# Patient Record
Sex: FEMALE | Race: WHITE | NOT HISPANIC OR LATINO | Employment: STUDENT | URBAN - METROPOLITAN AREA
[De-identification: names, ages, dates, MRNs, and addresses within clinical notes are randomized per-mention and may not be internally consistent; named-entity substitution may affect disease eponyms.]

---
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
## 2023-02-03 ENCOUNTER — OFFICE VISIT (OUTPATIENT)
Dept: NEUROLOGY | Facility: CLINIC | Age: 18
End: 2023-02-03
Payer: COMMERCIAL

## 2023-02-03 DIAGNOSIS — G43.109 MIGRAINE WITH VISUAL AURA: Primary | ICD-10-CM

## 2023-02-03 DIAGNOSIS — G43.009 MIGRAINE WITHOUT AURA AND WITHOUT STATUS MIGRAINOSUS, NOT INTRACTABLE: ICD-10-CM

## 2023-02-03 PROCEDURE — 99214 PR OFFICE/OUTPT VISIT, EST, LEVL IV, 30-39 MIN: ICD-10-PCS | Mod: 95,,, | Performed by: NURSE PRACTITIONER

## 2023-02-03 PROCEDURE — 99214 OFFICE O/P EST MOD 30 MIN: CPT | Mod: 95,,, | Performed by: NURSE PRACTITIONER

## 2023-02-03 PROCEDURE — 1160F RVW MEDS BY RX/DR IN RCRD: CPT | Mod: CPTII,95,, | Performed by: NURSE PRACTITIONER

## 2023-02-03 PROCEDURE — 1159F MED LIST DOCD IN RCRD: CPT | Mod: CPTII,95,, | Performed by: NURSE PRACTITIONER

## 2023-02-03 PROCEDURE — 1160F PR REVIEW ALL MEDS BY PRESCRIBER/CLIN PHARMACIST DOCUMENTED: ICD-10-PCS | Mod: CPTII,95,, | Performed by: NURSE PRACTITIONER

## 2023-02-03 PROCEDURE — 1159F PR MEDICATION LIST DOCUMENTED IN MEDICAL RECORD: ICD-10-PCS | Mod: CPTII,95,, | Performed by: NURSE PRACTITIONER

## 2023-02-03 RX ORDER — RIZATRIPTAN BENZOATE 10 MG/1
10 TABLET, ORALLY DISINTEGRATING ORAL
Qty: 12 TABLET | Refills: 5 | Status: SHIPPED | OUTPATIENT
Start: 2023-02-03

## 2023-02-03 RX ORDER — AMITRIPTYLINE HYDROCHLORIDE 25 MG/1
25 TABLET, FILM COATED ORAL NIGHTLY
Qty: 90 TABLET | Refills: 1 | Status: SHIPPED | OUTPATIENT
Start: 2023-02-03 | End: 2023-04-26 | Stop reason: SDUPTHER

## 2023-02-03 NOTE — PROGRESS NOTES
Established Patient   SUBJECTIVE:  Patient ID: Clair Meek   Chief Complaint: Follow-up    History of Present Illness:  Clair Meek is a 17 y.o. female who presents for follow-up of headaches via virtual visit.     The patient location is: in [place]   The chief complaint leading to consultation is: Follow-up  Visit type: Virtual visit with synchronous audio and video  Total time spent with patient: 8 minutes  Each patient to whom he or she provides medical services by telemedicine is:  (1) informed of the relationship between the physician and patient and the respective role of any other health care provider with respect to management of the patient; and (2) notified that he or she may decline to receive medical services by telemedicine and may withdraw from such care at any time.    Recommendations made at last Office Visit on 11/29/22:  - For migraine prevention - continue amitriptyline 25 mg qhs   - For acute migraines - maxalt 10 mg mlt   - Stressed importance of treating migraines with maxalt at onset, do not wait to treat, maxalt less likely to be effective the longer she waits to treat    - Continue tracking headaches   - RTC in 3 months or sooner if needed    02/03/2023 - Interval History:  Migraines have decreased in both frequency and intensity since changing the way she treats acute migraines.  She conitnues to experience headaches on 15/30 days per month, however some days she rates pain 1 out of 10.  Treating acute migraines with maxalt 10 mg mlt, which helps, however feels migraines begin to come back after a few hours.  Does feel migraines are worse in that they are now less responsive to maxalt.  Has noticed some positional component to her headaches.  She has continued amitriptyline 25 mg nightly for migraine prevention.      Otherwise, information below is still accurate and current.     11/29/2022 - Interval History:  Migraines have been better in that they are less frequent, though still  lasting days when she gets one.  Admits migraines are typically present upon waking, will usually drink water and see if this helps abraham her migraine, usually does not help.  Waits to treat with maxalt.  Feels more often than not maxalt is ineffective.  She has continued amitriptyline 25 mg qhs for migraine prevention, does feel higher does has helped to decrease the frequency of her migraines.       Otherwise, information below is still accurate and current.      09/20/2022 - Interval History:  Did not start topiramate because she was going home for the summer, seen by Neurology provider at home who recommended restarting amitriptyline 10 mg nightly for migraine prevention.  Has been taking amitriptyline 10 mg for 3 months and feels headaches have been better for the last 1-1.5 months.  Out of the last 30 days, has had 15 headache days, most of which are mild in intensity, 3-4 moderate to severe migraines.  Typically treats mild headaches with either tylenol, ibuprofen, excedrin.  Treats moderate to severe migraines with maxalt 5 mg mlt, which typically helps with one dose, occasionally has to repeat dose.       Otherwise, information below is still accurate and current.      04/06/2022 - Interval History:  Prednisone taper has helped some, decreased the intensity of her headaches.  However, she continues to experience some type of headache on a daily basis.  Majority of her headaches are not what she considers to be a migraine, maybe 2-3 migraines per week.  Migraines are lasting a few hours in duration.  Treating with caffeine and/or advil, which helps some.     Seen by neuro-ophthalmology who felt anisocoria likely related to amitriptyline.       Seen by pediatrician while back home who ordered mri brain, patient has copy of report with her today.  Per report, small left maxillary sinus retention cyst identified, otherwise imaging is unremarkable.  Pediatrician also started her on vyvanse 10 mg daily for  "treatment of ADD.     Otherwise, information below is still accurate and current.      History of Present Illness:   16 y.o. female with migraine headaches, who presents to clinic alone due to new onset anisocoria and worsening headaches.  Mid-December, began to experience frequent headaches, daily or every other day.  Seen by pediatrician who started her on amitriptyline 10 mg, took amitriptyline 10 mg nightly which was helping "a lot".  However, a few weeks, began to notice irregular dilation of pupils, was recommended to d/c amitriptyline per pediatrician.    Stopped amitriptyline, headaches have returned.  Currently suffering with headaches, again, daily or every other day, though less intense.  Headaches typically last 3 hours in duration.  Can be triggered by bright light, also bright light has trigger the onset of headaches.  In the past, visual symptoms have been more aggravating to her than the pain of a migraine.  Headaches were associated with nausea initially, though not very common.  Headaches are described as a moderate, stabbing pain (in the temples) and aching pain across her forehead.    Does have a personal and family history of migraine with aura.   Seen by ophthalmologist earlier today for dilated eye exam, felt anisocoria was physiologic, no identifiable cause of symptoms.  Had an ear infection in the right ear just before headaches began.    Diagnosed with COVID in November.      Treatments Tried and Response  Amitriptyline - helped, possibly cause of anisocoria  Magnesium/riboflavin -   topiramate - never took   maxlt 5 mg mlt - helping   Amitriptyline 10 - restarted 3 months ago, is helping some   Amitriptyline 25 mg qhs - helping  Maxalt 10 mg mlt -   Ubrelvy 100 mg - given today     Current Medications:    amitriptyline (ELAVIL) 25 MG tablet, Take 1 tablet (25 mg total) by mouth every evening., Disp: 90 tablet, Rfl: 1    JOCELINE 0.35 mg tablet, Take 1 tablet by mouth once daily., Disp: , " Rfl:     methylphenidate HCl (RITALIN LA) 10 MG 24 hr capsule, Take 10 mg by mouth every morning., Disp: , Rfl:     rizatriptan (MAXALT-MLT) 10 MG disintegrating tablet, Take 1 tablet (10 mg total) by mouth every 2 (two) hours as needed for Migraine. Max 30 mg/day., Disp: 12 tablet, Rfl: 5    ubrogepant (UBRELVY) 100 mg tablet, Take 1 tablet (100 mg total) by mouth every 2 (two) hours as needed for Migraine. Max 200 mg/day., Disp: 10 tablet, Rfl: 2    Review of Systems - A review of 10+ systems was conducted with pertinent positive and negative findings documented in HPI with all other systems reviewed and negative.    PFSH: Past medical, family, and social history reviewed as documented in chart with pertinent positive medical, family, and social history detailed in HPI.    OBJECTIVE:  Vitals: There were no vitals taken for this visit.     Physical Exam:  Constitutional: she appears well-developed and well-nourished. she is well groomed. NAD.     ASSESSMENT:  1. Migraine with visual aura    2. Migraine without aura and without status migrainosus, not intractable      PLAN:  - For migraine prevention - continue amitriptyline 25 mg qhs   - For acute migraines - maxalt 10 mg mlt   - Secondary treatment option - trial ubrelvy 100 mg   - Stressed importance of aggressively treating migraine from onset, avoid delays in treatment    - Continue tracking headaches   - RTC in 2-3 months or sooner if needed    Orders Placed This Encounter    ubrogepant (UBRELVY) 100 mg tablet    amitriptyline (ELAVIL) 25 MG tablet    rizatriptan (MAXALT-MLT) 10 MG disintegrating tablet     Questions and concerns were sought and answered to the patient's stated verbal satisfaction.  The patient verbalizes understanding and agreement with the above stated treatment plan.     CC: DO Tiarra Rodriguez, ALEX-C  [facility]   [phone]    Dr. Smith was available during today's encounter.

## 2023-03-06 ENCOUNTER — HOSPITAL ENCOUNTER (OUTPATIENT)
Dept: CARDIOLOGY | Facility: CLINIC | Age: 18
Discharge: HOME | End: 2023-03-06
Payer: COMMERCIAL

## 2023-03-06 ENCOUNTER — OFFICE VISIT (OUTPATIENT)
Dept: CARDIOLOGY | Facility: CLINIC | Age: 18
End: 2023-03-06
Payer: COMMERCIAL

## 2023-03-06 VITALS
OXYGEN SATURATION: 99 % | DIASTOLIC BLOOD PRESSURE: 73 MMHG | HEART RATE: 91 BPM | BODY MASS INDEX: 19.95 KG/M2 | HEIGHT: 68 IN | SYSTOLIC BLOOD PRESSURE: 111 MMHG | WEIGHT: 131.63 LBS

## 2023-03-06 DIAGNOSIS — R07.89 ATYPICAL CHEST PAIN: Primary | ICD-10-CM

## 2023-03-06 DIAGNOSIS — R07.89 ATYPICAL CHEST PAIN: ICD-10-CM

## 2023-03-06 DIAGNOSIS — G43.009 MIGRAINE WITHOUT AURA AND WITHOUT STATUS MIGRAINOSUS, NOT INTRACTABLE: ICD-10-CM

## 2023-03-06 DIAGNOSIS — J45.20 MILD INTERMITTENT ASTHMA WITHOUT COMPLICATION: ICD-10-CM

## 2023-03-06 PROCEDURE — 3078F PR MOST RECENT DIASTOLIC BLOOD PRESSURE < 80 MM HG: ICD-10-PCS | Mod: CPTII,S$GLB,, | Performed by: INTERNAL MEDICINE

## 2023-03-06 PROCEDURE — 3008F PR BODY MASS INDEX (BMI) DOCUMENTED: ICD-10-PCS | Mod: CPTII,S$GLB,, | Performed by: INTERNAL MEDICINE

## 2023-03-06 PROCEDURE — 99999 PR PBB SHADOW E&M-EST. PATIENT-LVL IV: CPT | Mod: PBBFAC,,, | Performed by: INTERNAL MEDICINE

## 2023-03-06 PROCEDURE — 93005 ELECTROCARDIOGRAM TRACING: CPT | Mod: S$GLB,,, | Performed by: INTERNAL MEDICINE

## 2023-03-06 PROCEDURE — 93010 ELECTROCARDIOGRAM REPORT: CPT | Mod: S$GLB,,, | Performed by: INTERNAL MEDICINE

## 2023-03-06 PROCEDURE — 1159F PR MEDICATION LIST DOCUMENTED IN MEDICAL RECORD: ICD-10-PCS | Mod: CPTII,S$GLB,, | Performed by: INTERNAL MEDICINE

## 2023-03-06 PROCEDURE — 99203 PR OFFICE/OUTPT VISIT, NEW, LEVL III, 30-44 MIN: ICD-10-PCS | Mod: S$GLB,,, | Performed by: INTERNAL MEDICINE

## 2023-03-06 PROCEDURE — 1159F MED LIST DOCD IN RCRD: CPT | Mod: CPTII,S$GLB,, | Performed by: INTERNAL MEDICINE

## 2023-03-06 PROCEDURE — 3008F BODY MASS INDEX DOCD: CPT | Mod: CPTII,S$GLB,, | Performed by: INTERNAL MEDICINE

## 2023-03-06 PROCEDURE — 3074F PR MOST RECENT SYSTOLIC BLOOD PRESSURE < 130 MM HG: ICD-10-PCS | Mod: CPTII,S$GLB,, | Performed by: INTERNAL MEDICINE

## 2023-03-06 PROCEDURE — 3078F DIAST BP <80 MM HG: CPT | Mod: CPTII,S$GLB,, | Performed by: INTERNAL MEDICINE

## 2023-03-06 PROCEDURE — 3074F SYST BP LT 130 MM HG: CPT | Mod: CPTII,S$GLB,, | Performed by: INTERNAL MEDICINE

## 2023-03-06 PROCEDURE — 93005 EKG 12-LEAD: ICD-10-PCS | Mod: S$GLB,,, | Performed by: INTERNAL MEDICINE

## 2023-03-06 PROCEDURE — 93010 EKG 12-LEAD: ICD-10-PCS | Mod: S$GLB,,, | Performed by: INTERNAL MEDICINE

## 2023-03-06 PROCEDURE — 99203 OFFICE O/P NEW LOW 30 MIN: CPT | Mod: S$GLB,,, | Performed by: INTERNAL MEDICINE

## 2023-03-06 PROCEDURE — 99999 PR PBB SHADOW E&M-EST. PATIENT-LVL IV: ICD-10-PCS | Mod: PBBFAC,,, | Performed by: INTERNAL MEDICINE

## 2023-03-06 NOTE — PROGRESS NOTES
Subjective:   Patient ID:  Clair Meek is a 17 y.o. female who presents for evaluation of CP    HPI:  Having some CP/discomfortThe patient has no  SOB, TIA, palpitations, syncope or pre-syncope.Patient does exercise. For about a week has non exertional mid chest pain with slight feeling in back that lasts about 20 s or less.Plays [employer]        Review of Systems   Constitutional: Negative for chills, decreased appetite, diaphoresis, fever, malaise/fatigue, night sweats, weight gain and weight loss.   HENT:  Negative for congestion, hoarse voice, nosebleeds, sore throat and tinnitus.    Eyes:  Negative for blurred vision, double vision, vision loss in left eye, vision loss in right eye, visual disturbance and visual halos.   Cardiovascular:  Positive for chest pain. Negative for claudication, cyanosis, dyspnea on exertion, irregular heartbeat, leg swelling, near-syncope, orthopnea, palpitations, paroxysmal nocturnal dyspnea and syncope.   Respiratory:  Negative for cough, hemoptysis, shortness of breath, sleep disturbances due to breathing, snoring, sputum production and wheezing.    Endocrine: Negative for cold intolerance, heat intolerance, polydipsia, polyphagia and polyuria.   Hematologic/Lymphatic: Negative for adenopathy and bleeding problem. Does not bruise/bleed easily.   Skin:  Negative for color change, dry skin, flushing, itching, nail changes, poor wound healing, rash, skin cancer, suspicious lesions and unusual hair distribution.   Musculoskeletal:  Negative for arthritis, back pain, falls, gout, joint pain, joint swelling, muscle cramps, muscle weakness, myalgias and stiffness.   Gastrointestinal:  Negative for abdominal pain, anorexia, change in bowel habit, constipation, diarrhea, dysphagia, heartburn, hematemesis, hematochezia, melena and vomiting.   Genitourinary:  Negative for decreased libido, dysuria, hematuria, hesitancy and urgency.   Neurological:  Negative for excessive daytime  "sleepiness, dizziness, focal weakness, headaches, light-headedness, loss of balance, numbness, paresthesias, seizures, sensory change, tremors, vertigo and weakness.   Psychiatric/Behavioral:  Negative for altered mental status, depression, hallucinations, memory loss, substance abuse and suicidal ideas. The patient does not have insomnia and is not nervous/anxious.    Allergic/Immunologic: Negative for environmental allergies and hives.     Objective: /73 (BP Location: Left arm, Patient Position: Sitting, BP Method: Medium (Automatic))   Pulse 91   Ht 5' 8" (1.727 m)   Wt 59.7 kg (131 lb 9.8 oz)   SpO2 99%   BMI 20.01 kg/m²      Physical Exam  Constitutional:       Appearance: She is well-developed.   HENT:      Head: Normocephalic.   Eyes:      Pupils: Pupils are equal, round, and reactive to light.   Neck:      Thyroid: No thyromegaly.      Vascular: Normal carotid pulses. No carotid bruit, hepatojugular reflux or JVD.   Cardiovascular:      Rate and Rhythm: Normal rate and regular rhythm.      Pulses: Intact distal pulses.      Heart sounds: Normal heart sounds. No murmur heard.    No friction rub. No gallop.   Pulmonary:      Effort: Pulmonary effort is normal. No tachypnea or respiratory distress.      Breath sounds: Normal breath sounds. No wheezing or rales.   Chest:      Chest wall: No tenderness.   Abdominal:      General: Bowel sounds are normal. There is no distension.      Palpations: Abdomen is soft. There is no mass.      Tenderness: There is no abdominal tenderness. There is no guarding or rebound.   Musculoskeletal:         General: No tenderness. Normal range of motion.      Cervical back: Normal range of motion.   Lymphadenopathy:      Cervical: No cervical adenopathy.   Skin:     General: Skin is warm.      Findings: No erythema or rash.   Neurological:      Mental Status: She is alert and oriented to person, place, and time.      Cranial Nerves: No cranial nerve deficit.      " Coordination: Coordination normal.   Psychiatric:         Behavior: Behavior normal.         Thought Content: Thought content normal.         Judgment: Judgment normal.       Assessment:     1. Atypical chest pain    2. Migraine without aura and without status migrainosus, not intractable    3. Mild intermittent asthma without complication        Plan:   Discussed diet , achieving and maintaining ideal body weight, and exercise.   We reviewed meds in detail.  Reassured-Discussed goals, options, plan.  Echo if CP continues or if ECG is abnormal  Try massage or movements to determine if pain is muscular which is more likely  Clair was seen today for establish care, follow-up and chest pain.    Diagnoses and all orders for this visit:    Atypical chest pain  -     EKG 12-lead; Future; Expected date: 03/07/2023  -     Lipid Panel; Future; Expected date: 03/07/2023    Migraine without aura and without status migrainosus, not intractable    Mild intermittent asthma without complication            Follow up for ECG now and lipids soon.

## 2023-03-06 NOTE — PROGRESS NOTES
Your results look fine and do not require any change in treatment. ECG actually looks great but we can still do Echo if symptoms remain worrisome.    Please contact me if you have any additional concerns.    Sincerely,    Vance Ardon

## 2023-03-06 NOTE — PATIENT INSTRUCTIONS
Reassured-Discussed goals, options, plan.  Echo if CP continues or if ECG is abnormal  Try massage or movements to determine if pain is muscular which is more likely

## 2023-03-09 ENCOUNTER — LAB VISIT (OUTPATIENT)
Dept: LAB | Facility: HOSPITAL | Age: 18
End: 2023-03-09
Attending: INTERNAL MEDICINE
Payer: COMMERCIAL

## 2023-03-09 DIAGNOSIS — R07.89 ATYPICAL CHEST PAIN: ICD-10-CM

## 2023-03-09 LAB
CHOLEST SERPL-MCNC: 153 MG/DL (ref 120–199)
CHOLEST/HDLC SERPL: 2.5 {RATIO} (ref 2–5)
HDLC SERPL-MCNC: 61 MG/DL (ref 40–75)
HDLC SERPL: 39.9 % (ref 20–50)
LDLC SERPL CALC-MCNC: 79.2 MG/DL (ref 63–159)
NONHDLC SERPL-MCNC: 92 MG/DL
TRIGL SERPL-MCNC: 64 MG/DL (ref 30–150)

## 2023-03-09 PROCEDURE — 36415 COLL VENOUS BLD VENIPUNCTURE: CPT | Performed by: INTERNAL MEDICINE

## 2023-03-09 PROCEDURE — 80061 LIPID PANEL: CPT | Performed by: INTERNAL MEDICINE

## 2023-05-05 ENCOUNTER — OFFICE VISIT (OUTPATIENT)
Dept: NEUROLOGY | Facility: CLINIC | Age: 18
End: 2023-05-05
Payer: COMMERCIAL

## 2023-05-05 DIAGNOSIS — G43.009 MIGRAINE WITHOUT AURA AND WITHOUT STATUS MIGRAINOSUS, NOT INTRACTABLE: Primary | ICD-10-CM

## 2023-05-05 DIAGNOSIS — G43.109 MIGRAINE WITH VISUAL AURA: ICD-10-CM

## 2023-05-05 PROCEDURE — 1160F PR REVIEW ALL MEDS BY PRESCRIBER/CLIN PHARMACIST DOCUMENTED: ICD-10-PCS | Mod: CPTII,95,, | Performed by: NURSE PRACTITIONER

## 2023-05-05 PROCEDURE — 99214 PR OFFICE/OUTPT VISIT, EST, LEVL IV, 30-39 MIN: ICD-10-PCS | Mod: 95,,, | Performed by: NURSE PRACTITIONER

## 2023-05-05 PROCEDURE — 99214 OFFICE O/P EST MOD 30 MIN: CPT | Mod: 95,,, | Performed by: NURSE PRACTITIONER

## 2023-05-05 PROCEDURE — 1159F MED LIST DOCD IN RCRD: CPT | Mod: CPTII,95,, | Performed by: NURSE PRACTITIONER

## 2023-05-05 PROCEDURE — 1159F PR MEDICATION LIST DOCUMENTED IN MEDICAL RECORD: ICD-10-PCS | Mod: CPTII,95,, | Performed by: NURSE PRACTITIONER

## 2023-05-05 PROCEDURE — 1160F RVW MEDS BY RX/DR IN RCRD: CPT | Mod: CPTII,95,, | Performed by: NURSE PRACTITIONER

## 2023-05-05 RX ORDER — RIMEGEPANT SULFATE 75 MG/75MG
75 TABLET, ORALLY DISINTEGRATING ORAL DAILY PRN
Qty: 16 TABLET | Refills: 2 | Status: SHIPPED | OUTPATIENT
Start: 2023-05-05

## 2023-05-05 RX ORDER — AMITRIPTYLINE HYDROCHLORIDE 25 MG/1
25 TABLET, FILM COATED ORAL NIGHTLY
Qty: 90 TABLET | Refills: 0 | Status: SHIPPED | OUTPATIENT
Start: 2023-05-05 | End: 2023-06-06 | Stop reason: SDUPTHER

## 2023-05-05 NOTE — PROGRESS NOTES
Established Patient   SUBJECTIVE:  Patient ID: Clair Meek   Chief Complaint: Follow-up    History of Present Illness:  Clair Meek is a 17 y.o. female who presents for follow-up of headaches via virtual visit.     The patient location is: in [place]   The chief complaint leading to consultation is: Follow-up  Visit type: Virtual visit with synchronous audio and video  Total time spent with patient: 17 min  Each patient to whom he or she provides medical services by telemedicine is:  (1) informed of the relationship between the physician and patient and the respective role of any other health care provider with respect to management of the patient; and (2) notified that he or she may decline to receive medical services by telemedicine and may withdraw from such care at any time.    Recommendations made at last Office Visit on 2/3/23:  - For migraine prevention - continue amitriptyline 25 mg qhs   - For acute migraines - maxalt 10 mg mlt   - Secondary treatment option - trial ubrelvy 100 mg   - Stressed importance of aggressively treating migraine from onset, avoid delays in treatment    - Continue tracking headaches   - RTC in 2-3 months or sooner if needed    05/05/2023 - Interval History:  Migraines remained relatively stable, though more recently has had a handful of moderate to severe migraines, feels these have been triggered by stress/finals etc.  She has continued amitriptyline 25 mg nightly, which continues to reduce the frequency of her migraines, though does feel she could have better migraine control, also does not like the idea of taking an anti-depressant and would like to change her preventive therapy.  She treats acute migraines with either ubrelvy or maxalt, feels ubrelvy helps take the edge off hte pain but does not fully abort her migraine.  Has only taken ubrelvy and maxalt together once, did find the combination to be more effective than either alone.   Going back home to [place] for the  summer, plans to return to [place] in August.    Otherwise, information below is still accurate and current.     02/03/2023 - Interval History:  Migraines have decreased in both frequency and intensity since changing the way she treats acute migraines.  She conitnues to experience headaches on 15/30 days per month, however some days she rates pain 1 out of 10.  Treating acute migraines with maxalt 10 mg mlt, which helps, however feels migraines begin to come back after a few hours.  Does feel migraines are worse in that they are now less responsive to maxalt.  Has noticed some positional component to her headaches.  She has continued amitriptyline 25 mg nightly for migraine prevention.       Otherwise, information below is still accurate and current.      11/29/2022 - Interval History:  Migraines have been better in that they are less frequent, though still lasting days when she gets one.  Admits migraines are typically present upon waking, will usually drink water and see if this helps abraham her migraine, usually does not help.  Waits to treat with maxalt.  Feels more often than not maxalt is ineffective.  She has continued amitriptyline 25 mg qhs for migraine prevention, does feel higher does has helped to decrease the frequency of her migraines.       Otherwise, information below is still accurate and current.      09/20/2022 - Interval History:  Did not start topiramate because she was going home for the summer, seen by Neurology provider at home who recommended restarting amitriptyline 10 mg nightly for migraine prevention.  Has been taking amitriptyline 10 mg for 3 months and feels headaches have been better for the last 1-1.5 months.  Out of the last 30 days, has had 15 headache days, most of which are mild in intensity, 3-4 moderate to severe migraines.  Typically treats mild headaches with either tylenol, ibuprofen, excedrin.  Treats moderate to severe migraines with maxalt 5 mg mlt, which typically  "helps with one dose, occasionally has to repeat dose.       Otherwise, information below is still accurate and current.      04/06/2022 - Interval History:  Prednisone taper has helped some, decreased the intensity of her headaches.  However, she continues to experience some type of headache on a daily basis.  Majority of her headaches are not what she considers to be a migraine, maybe 2-3 migraines per week.  Migraines are lasting a few hours in duration.  Treating with caffeine and/or advil, which helps some.     Seen by neuro-ophthalmology who felt anisocoria likely related to amitriptyline.       Seen by pediatrician while back home who ordered mri brain, patient has copy of report with her today.  Per report, small left maxillary sinus retention cyst identified, otherwise imaging is unremarkable.  Pediatrician also started her on vyvanse 10 mg daily for treatment of ADD.     Otherwise, information below is still accurate and current.      History of Present Illness:   16 y.o. female with migraine headaches, who presents to clinic alone due to new onset anisocoria and worsening headaches.  Mid-December, began to experience frequent headaches, daily or every other day.  Seen by pediatrician who started her on amitriptyline 10 mg, took amitriptyline 10 mg nightly which was helping "a lot".  However, a few weeks, began to notice irregular dilation of pupils, was recommended to d/c amitriptyline per pediatrician.    Stopped amitriptyline, headaches have returned.  Currently suffering with headaches, again, daily or every other day, though less intense.  Headaches typically last 3 hours in duration.  Can be triggered by bright light, also bright light has trigger the onset of headaches.  In the past, visual symptoms have been more aggravating to her than the pain of a migraine.  Headaches were associated with nausea initially, though not very common.  Headaches are described as a moderate, stabbing pain (in the " temples) and aching pain across her forehead.    Does have a personal and family history of migraine with aura.   Seen by ophthalmologist earlier today for dilated eye exam, felt anisocoria was physiologic, no identifiable cause of symptoms.  Had an ear infection in the right ear just before headaches began.    Diagnosed with COVID in November.      Treatments Tried and Response  Amitriptyline - helped, possibly cause of anisocoria  Magnesium/riboflavin -   topiramate -   maxlt 5 mg mlt - helping   Amitriptyline 10 - restarted 3 months ago, is helping some   Amitriptyline 25 mg qhs - helping  Maxalt 10 mg mlt -   Ubrelvy 100 mg - helping   Emgality - given today   Nurtec 75 mg odt - given today     Current Medications:    amitriptyline (ELAVIL) 25 MG tablet, Take 1 tablet (25 mg total) by mouth every evening., Disp: 90 tablet, Rfl: 0    JOCELINE 0.35 mg tablet, Take 1 tablet by mouth once daily., Disp: , Rfl:     galcanezumab-gnlm 120 mg/mL PnIj, Inject 240 mg into the skin once. for 1 dose, Disp: 2 mL, Rfl: 0    galcanezumab-gnlm 120 mg/mL PnIj, Inject 120 mg into the skin every 28 days. Begin 28 days after loading dose., Disp: 1 each, Rfl: 10    methylphenidate HCl (RITALIN LA) 10 MG 24 hr capsule, Take 10 mg by mouth every morning., Disp: , Rfl:     rimegepant (NURTEC) 75 mg odt, Take 1 tablet (75 mg total) by mouth daily as needed for Migraine. Place ODT tablet on the tongue; alternatively the ODT tablet may be placed under the tongue, Disp: 16 tablet, Rfl: 2    rizatriptan (MAXALT-MLT) 10 MG disintegrating tablet, Take 1 tablet (10 mg total) by mouth every 2 (two) hours as needed for Migraine. Max 30 mg/day., Disp: 12 tablet, Rfl: 5    ubrogepant (UBRELVY) 100 mg tablet, Take 1 tablet (100 mg total) by mouth every 2 (two) hours as needed for Migraine. Max 200 mg/day., Disp: 10 tablet, Rfl: 2    Review of Systems - A review of 10+ systems was conducted with pertinent positive and negative findings documented in  HPI with all other systems reviewed and negative.    PFSH: Past medical, family, and social history reviewed as documented in chart with pertinent positive medical, family, and social history detailed in HPI.    OBJECTIVE:  Vitals: There were no vitals taken for this visit.     Physical Exam:  Constitutional: she appears well-developed and well-nourished. she is well groomed. NAD.     Review of Data:   Notes from cardiology reviewed   Labs:  Lab Visit on 03/09/2023   Component Date Value Ref Range Status    Cholesterol 03/09/2023 153  120 - 199 mg/dL Final    Triglycerides 03/09/2023 64  30 - 150 mg/dL Final    HDL 03/09/2023 61  40 - 75 mg/dL Final    LDL Cholesterol 03/09/2023 79.2  63.0 - 159.0 mg/dL Final    HDL/Cholesterol Ratio 03/09/2023 39.9  20.0 - 50.0 % Final    Total Cholesterol/HDL Ratio 03/09/2023 2.5  2.0 - 5.0 Final    Non-HDL Cholesterol 03/09/2023 92  mg/dL Final     Imaging:  No results found for this or any previous visit.  Note: I have independently reviewed any/all imaging/labs/tests and agree with the report (s) as documented.  Any discrepancies will be as noted/demarcated by free text.  BIJAN GALVAN 5/5/2023    ASSESSMENT:  1. Migraine without aura and without status migrainosus, not intractable    2. Migraine with visual aura      PLAN:  - For migraine prevention - Start Emgality 240 mg SQ loading dose followed by 120 mg SQ monthly injections.   - Instructed her to continue amitriptyline 25 mg nightly for at least another month, send update via patient portal in one month, will give tapering instructions at that time if migraines are controlled   - Trial nurtec 75 mg odt - take preventively the morning of known migraine trigger (high stress day, text day, etc), should migraine still come on - must treat with maxalt 10 mg mlt   - Cannot take ubrelvy and nurtec on the same day   - Can treat with ubrelvy 100 mg   - Continue tracking headaches   - Risks, benefits, and potential side effects of  emgality discussed  - Alternative treatment options offered   - RTC in 3 months or sooner if needed    Orders Placed This Encounter    rimegepant (NURTEC) 75 mg odt    galcanezumab-gnlm 120 mg/mL PnIj    galcanezumab-gnlm 120 mg/mL PnIj    amitriptyline (ELAVIL) 25 MG tablet     Questions and concerns were sought and answered to the patient's stated verbal satisfaction.  The patient verbalizes understanding and agreement with the above stated treatment plan.     CC: DO Tiarra Rodriguez, FNP-C  [facility]   [phone]    Dr. Smith was available during today's encounter.

## 2023-05-29 ENCOUNTER — PATIENT MESSAGE (OUTPATIENT)
Dept: NEUROLOGY | Facility: CLINIC | Age: 18
End: 2023-05-29
Payer: COMMERCIAL

## 2023-05-29 DIAGNOSIS — G43.009 MIGRAINE WITHOUT AURA AND WITHOUT STATUS MIGRAINOSUS, NOT INTRACTABLE: ICD-10-CM

## 2023-06-06 RX ORDER — AMITRIPTYLINE HYDROCHLORIDE 25 MG/1
25 TABLET, FILM COATED ORAL NIGHTLY
Qty: 90 TABLET | Refills: 0 | Status: SHIPPED | OUTPATIENT
Start: 2023-06-06

## 2023-06-29 DIAGNOSIS — G43.009 MIGRAINE WITHOUT AURA AND WITHOUT STATUS MIGRAINOSUS, NOT INTRACTABLE: ICD-10-CM

## 2023-06-29 RX ORDER — GALCANEZUMAB 120 MG/ML
INJECTION, SOLUTION SUBCUTANEOUS
Qty: 2 EACH | Refills: 0 | Status: SHIPPED | OUTPATIENT
Start: 2023-06-29 | End: 2023-07-30 | Stop reason: SDUPTHER

## 2023-07-30 DIAGNOSIS — G43.009 MIGRAINE WITHOUT AURA AND WITHOUT STATUS MIGRAINOSUS, NOT INTRACTABLE: ICD-10-CM

## 2023-07-31 RX ORDER — GALCANEZUMAB 120 MG/ML
240 INJECTION, SOLUTION SUBCUTANEOUS ONCE
Qty: 2 EACH | Refills: 0 | Status: SHIPPED | OUTPATIENT
Start: 2023-07-31 | End: 2023-07-31

## 2024-09-20 NOTE — TELEPHONE ENCOUNTER
Called pt to reschedule her appt with Ms. Toscano. I was unable to speak with the pt but left a vm for her to call back.   
See Dr Hernández within 7 days of discharge from rehab